# Patient Record
Sex: MALE | Race: WHITE | NOT HISPANIC OR LATINO
[De-identification: names, ages, dates, MRNs, and addresses within clinical notes are randomized per-mention and may not be internally consistent; named-entity substitution may affect disease eponyms.]

---

## 2017-02-11 PROBLEM — Z00.00 ENCOUNTER FOR PREVENTIVE HEALTH EXAMINATION: Status: ACTIVE | Noted: 2017-02-11

## 2017-02-16 ENCOUNTER — APPOINTMENT (OUTPATIENT)
Dept: UROLOGY | Facility: CLINIC | Age: 62
End: 2017-02-16

## 2017-02-16 VITALS
TEMPERATURE: 98 F | DIASTOLIC BLOOD PRESSURE: 46 MMHG | SYSTOLIC BLOOD PRESSURE: 125 MMHG | HEIGHT: 68 IN | WEIGHT: 215 LBS | HEART RATE: 62 BPM | RESPIRATION RATE: 16 BRPM | BODY MASS INDEX: 32.58 KG/M2

## 2017-02-16 DIAGNOSIS — Z86.79 PERSONAL HISTORY OF OTHER DISEASES OF THE CIRCULATORY SYSTEM: ICD-10-CM

## 2017-02-16 DIAGNOSIS — Z87.891 PERSONAL HISTORY OF NICOTINE DEPENDENCE: ICD-10-CM

## 2017-02-16 DIAGNOSIS — Z78.9 OTHER SPECIFIED HEALTH STATUS: ICD-10-CM

## 2017-02-16 RX ORDER — SILDENAFIL 20 MG/1
20 TABLET ORAL DAILY
Qty: 30 | Refills: 11 | Status: ACTIVE | COMMUNITY
Start: 2017-02-16 | End: 1900-01-01

## 2017-02-17 PROBLEM — Z86.79 HISTORY OF HYPERTENSION: Status: RESOLVED | Noted: 2017-02-16 | Resolved: 2017-02-17

## 2017-02-17 PROBLEM — Z87.891 FORMER SMOKER: Status: ACTIVE | Noted: 2017-02-16

## 2017-02-17 LAB
PSA FREE FLD-MCNC: 16.1 %
PSA FREE SERPL-MCNC: 1.04 NG/ML
PSA SERPL-MCNC: 6.46 NG/ML

## 2017-02-17 RX ORDER — AMLODIPINE AND VALSARTAN 5; 160 MG/1; MG/1
5-160 TABLET, FILM COATED ORAL
Refills: 0 | Status: ACTIVE | COMMUNITY

## 2017-03-09 ENCOUNTER — FORM ENCOUNTER (OUTPATIENT)
Age: 62
End: 2017-03-09

## 2017-03-10 ENCOUNTER — OUTPATIENT (OUTPATIENT)
Dept: OUTPATIENT SERVICES | Facility: HOSPITAL | Age: 62
LOS: 1 days | End: 2017-03-10
Payer: COMMERCIAL

## 2017-03-10 ENCOUNTER — APPOINTMENT (OUTPATIENT)
Dept: MRI IMAGING | Facility: CLINIC | Age: 62
End: 2017-03-10

## 2017-03-10 DIAGNOSIS — R97.20 ELEVATED PROSTATE SPECIFIC ANTIGEN [PSA]: ICD-10-CM

## 2017-03-10 PROCEDURE — 72197 MRI PELVIS W/O & W/DYE: CPT

## 2017-03-10 PROCEDURE — 82565 ASSAY OF CREATININE: CPT

## 2017-03-10 PROCEDURE — A9585: CPT

## 2018-07-26 PROBLEM — Z78.9 ALCOHOL USE: Status: ACTIVE | Noted: 2017-02-16

## 2019-06-25 ENCOUNTER — APPOINTMENT (OUTPATIENT)
Dept: UROLOGY | Facility: CLINIC | Age: 64
End: 2019-06-25
Payer: MEDICARE

## 2019-06-25 PROCEDURE — 99214 OFFICE O/P EST MOD 30 MIN: CPT

## 2019-06-25 RX ORDER — ASPIRIN 81 MG
81 TABLET, DELAYED RELEASE (ENTERIC COATED) ORAL
Refills: 0 | Status: ACTIVE | COMMUNITY

## 2019-06-25 RX ORDER — ROSUVASTATIN CALCIUM 20 MG/1
20 TABLET, FILM COATED ORAL
Refills: 0 | Status: ACTIVE | COMMUNITY

## 2019-06-25 NOTE — ASSESSMENT
[FreeTextEntry1] : Repeat PSA in 2 weeks, will send results.\par If remains elevated, then will agree to go forward with biopsy.

## 2019-06-25 NOTE — PHYSICAL EXAM
[General Appearance - Well Nourished] : well nourished [General Appearance - Well Developed] : well developed [Normal Appearance] : normal appearance [Well Groomed] : well groomed [General Appearance - In No Acute Distress] : no acute distress [Abdomen Soft] : soft [Abdomen Tenderness] : non-tender [Costovertebral Angle Tenderness] : no ~M costovertebral angle tenderness [Urethral Meatus] : meatus normal [Urinary Bladder Findings] : the bladder was normal on palpation [Scrotum] : the scrotum was normal [Testes Mass (___cm)] : there were no testicular masses [No Prostate Nodules] : no prostate nodules [Prostate Size ___ gm] : prostate size [unfilled] gm [] : no respiratory distress [Edema] : no peripheral edema [Exaggerated Use Of Accessory Muscles For Inspiration] : no accessory muscle use [Respiration, Rhythm And Depth] : normal respiratory rhythm and effort [Oriented To Time, Place, And Person] : oriented to person, place, and time [Affect] : the affect was normal [Not Anxious] : not anxious [Mood] : the mood was normal [Normal Station and Gait] : the gait and station were normal for the patient's age [No Focal Deficits] : no focal deficits [No Palpable Adenopathy] : no palpable adenopathy

## 2019-06-25 NOTE — HISTORY OF PRESENT ILLNESS
[FreeTextEntry1] : Here for follow up after two years.\par PSA Feb 2019: 7.1\par Jun 2019: 9.9.\par Had incident of urinary irritation in May 31st, treated with oral Bactrim.\par Prior MRI 65 mL, no suspicious lesion.  No prior biopsy.\par \par BPH: minimal symptoms.  Nocturia 2-3.  Flow is okay.\par No hematuria, dysuria.  No incontinence.\par No abdominal or flank pain.

## 2023-03-07 ENCOUNTER — APPOINTMENT (OUTPATIENT)
Dept: UROLOGY | Facility: CLINIC | Age: 68
End: 2023-03-07
Payer: MEDICARE

## 2023-03-07 VITALS
HEART RATE: 93 BPM | DIASTOLIC BLOOD PRESSURE: 72 MMHG | SYSTOLIC BLOOD PRESSURE: 122 MMHG | OXYGEN SATURATION: 96 % | TEMPERATURE: 98.5 F | HEIGHT: 68 IN | BODY MASS INDEX: 32.58 KG/M2 | WEIGHT: 215 LBS

## 2023-03-07 DIAGNOSIS — R35.1 NOCTURIA: ICD-10-CM

## 2023-03-07 PROCEDURE — 99204 OFFICE O/P NEW MOD 45 MIN: CPT

## 2023-03-08 ENCOUNTER — RESULT CHARGE (OUTPATIENT)
Age: 68
End: 2023-03-08

## 2023-03-10 LAB
APPEARANCE: CLEAR
BACTERIA: NEGATIVE
BILIRUB UR QL STRIP: NORMAL
BILIRUBIN URINE: NEGATIVE
BLOOD URINE: NEGATIVE
CLARITY UR: CLEAR
COLLECTION METHOD: NORMAL
COLOR: YELLOW
GLUCOSE QUALITATIVE U: NEGATIVE
GLUCOSE UR-MCNC: NORMAL
HCG UR QL: 0.2 EU/DL
HGB UR QL STRIP.AUTO: NORMAL
HYALINE CASTS: 0 /LPF
KETONES UR-MCNC: NORMAL
KETONES URINE: NEGATIVE
LEUKOCYTE ESTERASE UR QL STRIP: NORMAL
LEUKOCYTE ESTERASE URINE: NEGATIVE
MICROSCOPIC-UA: NORMAL
NITRITE UR QL STRIP: NORMAL
NITRITE URINE: NEGATIVE
PH UR STRIP: 5.5
PH URINE: 6
PROT UR STRIP-MCNC: NORMAL
PROTEIN URINE: NORMAL
RED BLOOD CELLS URINE: 4 /HPF
SP GR UR STRIP: 1.02
SPECIFIC GRAVITY URINE: 1.03
SQUAMOUS EPITHELIAL CELLS: 0 /HPF
UROBILINOGEN URINE: NORMAL
WHITE BLOOD CELLS URINE: 0 /HPF

## 2023-03-21 NOTE — ADDENDUM
[FreeTextEntry1] : 3/7/2023: UA micro Neg blood, Trace protein, 4 RBC, 0 WBc, 0 Epi, Neg NIT/LE\par \par 3/10/2023: Called pt and recommended CTU and cysto because of microhematuria. He is aware and wants to speak to Dr. Marquez first. He gave me permission to text her about this which I did. He will get back to me Monday\par \par 3/13/2023: Spoke to patient. He had his PSA checked he will fax it over and he agreed to CTU and cysto. Will arrange\par \par 3/20/2023: CTwwoC\par Multiple small renal cysts and BL PP cysts which cause compression on upper collecting tracts. There is no evidence of mass or stones. No hydro. Prostate is markedly enlarged impressing on base of bladder. Associated mild diffuse thickening of the wall of the bladder. No discrete mass. Sigmoid diverticulosis. Small fat cont RIH. Small low density nodule right adrenal measures less than 10Hu pre contrast and c/w adrenal adenoma. Multiple hepatic cysts.

## 2023-03-21 NOTE — HISTORY OF PRESENT ILLNESS
[FreeTextEntry1] : Language: English\par Date of First visit: 3/7/2023\par Accompanied by: Self\par Contact info: ***\par Referring Provider/PCP: Dr. Drew Martin\par phone: [phone]\par fax: [phone]\par \par Prior Urologist [phone]\par Dr. Gage Tracy\par \par Dr. Marquez\par [phone]\par \par \par \par CC/ Problem List:\par \par ===============================================================================\par FIRST VISIT:\par The patient is a 67 year male who first presents 03/07/2023 for BPH and LUTS. He is related to Dr. Marquez.\par \par He had been seeing Dr. Paz in  irregularly and had an elevated PSA. He never f/u after his visit in 2019.\par \par After Dr. Paz he went to a urologist in [facility] in 2021. He had a prostate biopsy and it was negative. His PCP then told him to never had a prostate biopsy again.\par \par He now states his PSA is around 6.9. It was done in October 2022.\par \par He is now bothered by nocturia 3-4x/night. He only voids small amts. He denies daytime frequency. He also reports slow stream. He does have hesitancy, he denies straining to void.\par \par He was given Tamsulosin 0.8mg po QHS since about 2020. He was also given Tadalafil that he takes it on occasion. \par \par -------------------------------------------------------------------------------------------\par INTERVAL VISITS:\par \par \par ===============================================================================\par \par PMH: HTN, HLD, BPH, elevated PSA\par PSH: Denies\par POBH: (if applicable)\par FH: \par \par ALL: NKDA\par MEDS: Tamsulosin, Tadalafil Rosuvastatin, Amlodipine, Losartan, Vitamin given by wife\par SOC: Quit smoking age 48yo but prior heavy smoker, +EtOH, denies drugs\par \par \par ROS: Review of Systems is as per HPI unless otherwise denoted below\par \par \par ===============================================================================\par DATA: \par \par LABS:-------------------------------------------------------------------------------------------------------------------\par 2/16/2017: PSA 6.46\par 2/18/2019: PSA 7.1\par 6/20/2019: PSA 9.9\par 3/7/2023: UA dip small blood, otherwise negative\par \par \par \par RADS:-------------------------------------------------------------------------------------------------------------------\par 3/10/2017: MRI prostate\par Prostate 5.1x5x5.2cm for volume of 69gm\par No hemorrhage. no focal finding. Moderate nodular hypertrophy; intact capsule and NVB, PIRADs 1\par \par 8/12/2022: MRI prostate \par Multinodular prostate without focal lesion to suggest malignancy PIRADS 2\par Measured 5.29x5.5x5.4 - 82gm\par \par \par PATHOLOGY/CYTOLOGY:-------------------------------------------------------------------------------------------\par \par \par \par VOIDING STUDIES: ----------------------------------------------------------------------------------------------------\par 3/7/2023: PVR 4cc\par \par \par STONE STUDIES: (Analysis/LLSA)----------------------------------------------------------------------------------\par \par \par \par PROCEDURES: -----------------------------------------------------------------------------------------------\par \par \par \par \par ===============================================================================\par \par PHYSICAL EXAM:\par \par GEN: AAOx3, NAD; Habitus: BMI 32\par \par BARRIERS to CARE: none\par \par PSYCH: Appropriate Behavior, Affect Congruent\par \par HEENT: AT/NC Trachea midline. EOMI.\par \par Lungs: No labored breathing.\par \par NEURO: + Movement, all 4 extremities grossly intact without deficits. No tremors.\par \par SKIN: Warm dry. No visible rashes or ulcers\par \par GAIT: Gait mildly antalgic, Stability good\par \par \par  FOCUSED: -------------------------------------------------------------------------------------------------\par \par Prostate: (date 3/7/2023) >50 grams. Smooth. No nodules. Symmetric. No tenderness, No Fluctuance.\par LEI: no hemorrhoids. Normal tone.\par =======================================================================================\par \par \par \par \par ASSESSMENT and PLAN\par \par The patient is a 67 year male with a history of the following:\par \par 1. LUTS with prostate volume >80gm, PIRADS 2, stable PSA by patient report with normal PSAD, low PSA, no sign of UTI on Tamsulosin 0.8mg po QHS and PRN Tadalafil with nocturia x3, weak stream and hesitancy\par \par I told him that he has the following options in this regard\par a. There is nothing dangerous going on. We can observe him.\par b. We can add finasteride 5mg. \par  We discussed the mechanism of action of finasteride and the reasons why we prescribe it. We generally use this medication for men with large (volume wise >40gm) prostates with BPH symptoms. This medication has the added benefit of reducing prostatic bleeding (spontaneous or surgical). Over the next six months (for finasteride) or 3-4 months (for dutasteride) it will also reduce volume by an average of 50%. It will also reduce PSA by an average of 50%. Its impact on cancer is somewhat unclear, but the reduction of PSA necessitates doubling the patient's PSA in order to the know the "true" PSA.\par The patient understands that the side effects of this medication include changes in ejaculation, ED and breast swelling. If any side effects occur that bother the patient, he can stop taking it. \par c. He could consider a surgery/procedure with the most definitive being HOLEP\par \par He prefers to have LUTS than have surgery and is not even sure if he wants to add finasteride. We will regroup for a video visit in 4 weeks to see what he wants to do.\par \par 2. h/o elevated PSA\par We are requesting his records from Dr. Ryndin (he will go in person to get them) and we are checking his PSA today. If it is still around 7, his PSA is stable and his PSAD is normal and I would NOT offer him a biopsy.\par \par 3. ED\par I told him if he takes tadalafil, he should not take tamsulosin that day as they interact and can cause hypotension and fainting and other sequelae including heart attack\par \par 4. r/o microhematuria\par Will send UA micro\par \par \par \par -----------------------------------------------------------------------------------------------------\par LABS/TESTS Ordered: F:T PSA, UA micro\par Meds Ordered: cont Tamsulosin, if he takes tadalafil do not take with or on same day as tamsulosin\par Follow up: VV 4 weeks\par -----------------------------------------------------------------------------------------------------\par \par The total amount of time I have personally spent preparing for this visit, reviewing the patient's test results, obtaining external history, ordering tests/medications, documenting clinical information, communicating with and counseling the patient/family and/or caregiver(s), and spent face to face with the patient explaining the above was  50 minutes.\par \par Thank you for allowing me to assist in the care of your patient. Should you have any questions please do not hesitate to reach out to me.\par \par \par Amber Mayer MD\par Associate \par Department of Urology\par [facility]\par Phone: [phone]\par Fax: [phone]\par \par [address] [place] [address]\par [facility] [address]\par

## 2023-03-28 ENCOUNTER — APPOINTMENT (OUTPATIENT)
Dept: UROLOGY | Facility: CLINIC | Age: 68
End: 2023-03-28
Payer: MEDICARE

## 2023-03-28 VITALS
DIASTOLIC BLOOD PRESSURE: 71 MMHG | HEART RATE: 63 BPM | SYSTOLIC BLOOD PRESSURE: 138 MMHG | TEMPERATURE: 97.8 F | OXYGEN SATURATION: 96 %

## 2023-03-28 PROCEDURE — 52000 CYSTOURETHROSCOPY: CPT

## 2023-03-28 PROCEDURE — 99214 OFFICE O/P EST MOD 30 MIN: CPT | Mod: 25

## 2023-03-29 ENCOUNTER — RESULT CHARGE (OUTPATIENT)
Age: 68
End: 2023-03-29

## 2023-03-29 LAB
BILIRUB UR QL STRIP: NORMAL
CLARITY UR: CLEAR
COLLECTION METHOD: NORMAL
GLUCOSE UR-MCNC: NORMAL
HCG UR QL: 0.2 EU/DL
HGB UR QL STRIP.AUTO: NORMAL
KETONES UR-MCNC: NORMAL
LEUKOCYTE ESTERASE UR QL STRIP: NORMAL
NITRITE UR QL STRIP: NORMAL
PH UR STRIP: 5.5
PROT UR STRIP-MCNC: NORMAL
SP GR UR STRIP: 1.02

## 2023-09-26 ENCOUNTER — APPOINTMENT (OUTPATIENT)
Dept: UROLOGY | Facility: CLINIC | Age: 68
End: 2023-09-26

## 2023-10-17 ENCOUNTER — APPOINTMENT (OUTPATIENT)
Dept: UROLOGY | Facility: CLINIC | Age: 68
End: 2023-10-17
Payer: MEDICARE

## 2023-10-17 VITALS
DIASTOLIC BLOOD PRESSURE: 80 MMHG | TEMPERATURE: 98 F | SYSTOLIC BLOOD PRESSURE: 133 MMHG | HEART RATE: 65 BPM | OXYGEN SATURATION: 98 %

## 2023-10-17 PROCEDURE — 99214 OFFICE O/P EST MOD 30 MIN: CPT

## 2024-01-11 ENCOUNTER — RX RENEWAL (OUTPATIENT)
Age: 69
End: 2024-01-11

## 2024-02-08 ENCOUNTER — RX RENEWAL (OUTPATIENT)
Age: 69
End: 2024-02-08

## 2024-02-08 RX ORDER — FINASTERIDE 5 MG/1
5 TABLET, FILM COATED ORAL DAILY
Qty: 30 | Refills: 1 | Status: ACTIVE | COMMUNITY
Start: 2023-10-17 | End: 1900-01-01

## 2024-03-11 ENCOUNTER — APPOINTMENT (OUTPATIENT)
Dept: UROLOGY | Facility: CLINIC | Age: 69
End: 2024-03-11
Payer: MEDICARE

## 2024-03-11 VITALS
DIASTOLIC BLOOD PRESSURE: 76 MMHG | TEMPERATURE: 98.24 F | HEART RATE: 89 BPM | OXYGEN SATURATION: 98 % | SYSTOLIC BLOOD PRESSURE: 115 MMHG

## 2024-03-11 DIAGNOSIS — N52.01 ERECTILE DYSFUNCTION DUE TO ARTERIAL INSUFFICIENCY: ICD-10-CM

## 2024-03-11 DIAGNOSIS — N13.8 BENIGN PROSTATIC HYPERPLASIA WITH LOWER URINARY TRACT SYMPMS: ICD-10-CM

## 2024-03-11 DIAGNOSIS — R97.20 ELEVATED PROSTATE, SPECIFIC ANTIGEN [PSA]: ICD-10-CM

## 2024-03-11 DIAGNOSIS — N40.1 BENIGN PROSTATIC HYPERPLASIA WITH LOWER URINARY TRACT SYMPMS: ICD-10-CM

## 2024-03-11 DIAGNOSIS — R31.29 OTHER MICROSCOPIC HEMATURIA: ICD-10-CM

## 2024-03-11 PROCEDURE — 99214 OFFICE O/P EST MOD 30 MIN: CPT

## 2024-03-12 LAB
APPEARANCE: CLEAR
BACTERIA: NEGATIVE /HPF
BILIRUBIN URINE: NEGATIVE
BLOOD URINE: NEGATIVE
CAST: 0 /LPF
COLOR: YELLOW
EPITHELIAL CELLS: 0 /HPF
GLUCOSE QUALITATIVE U: NEGATIVE MG/DL
KETONES URINE: NEGATIVE MG/DL
LEUKOCYTE ESTERASE URINE: NEGATIVE
MICROSCOPIC-UA: NORMAL
NITRITE URINE: NEGATIVE
PH URINE: 5.5
PROTEIN URINE: NEGATIVE MG/DL
RED BLOOD CELLS URINE: 1 /HPF
SPECIFIC GRAVITY URINE: 1.02
UROBILINOGEN URINE: 0.2 MG/DL
WHITE BLOOD CELLS URINE: 0 /HPF

## 2024-03-13 LAB — BACTERIA UR CULT: NORMAL

## 2024-03-13 NOTE — ADDENDUM
[FreeTextEntry1] : 3/12/24: UA with 1 RBC/hpf. Patient aware. 3/13/24: UCx <10,000 normal urogenital fernanda     I, Dr. Mayer, personally performed the evaluation and management (E/M) services for this established patient who presents today with (a) new problem(s)/exacerbation of (an) existing condition(s).  That E/M includes conducting the examination, assessing all new/exacerbated conditions, and establishing a new plan of care.  Today, my ACP, Sonya Wetzel, was here to observe my evaluation and management services for this new problem/exacerbated condition to be followed going forward.

## 2024-03-13 NOTE — HISTORY OF PRESENT ILLNESS
[FreeTextEntry1] : Language: English Date of First visit: 3/7/2023 Accompanied by: Self Contact info: *** Referring Provider/PCP: Dr. Drew Martin phone: [phone] fax: [phone]  Prior Urologist [phone] Dr. Gage Marquez [phone]    CC/ Problem List:  =============================================================================== FIRST VISIT: The patient is a 67 year male who first presents 2023 for BPH and LUTS. He is related to Dr. Marquez.  He had been seeing Dr. Paz in  irregularly and had an elevated PSA. He never f/u after his visit in .  After Dr. Paz he went to a urologist in [facility] in . He had a prostate biopsy and it was negative. His PCP then told him to never had a prostate biopsy again.  He now states his PSA is around 6.9. It was done in 2022.  He is now bothered by nocturia 3-4x/night. He only voids small amts. He denies daytime frequency. He also reports slow stream. He does have hesitancy, he denies straining to void.  He was given Tamsulosin 0.8mg po QHS since about . He was also given Tadalafil that he takes it on occasion.   ------------------------------------------------------------------------------------------- INTERVAL VISITS:  He had microhematuria and so we offered him a CTU to cysto. In 2023 his CTU showed a small adrenal adneoma and his cysto was normal except for BPH with an occlusive friable 4cm prostate. He is a little unhappy with his LUTS but does not want to try finasteride. He brings in his records fro Dr. Tracy today. He had a negative prostate biopsy in Dec 2020 and a negative prostate MRI in Aug 2022 (volume 89gm). He has nocturia is 4-5x/night. He is still on tamsulosin 0.8mg po QHS. He does NOT want surgery. He is adamant about not having surgery or a procedure.  The patient's age today 2024 is 68 year old. Please note interval events and changes in PMH, PSH, MEDS and ALLERGIES were reviewed. He feels well today and is satisfied for the most part on dual therapy as he is not interested in surgery. Symptoms remain stable.  He has not had any gross hematuria or infections since last OV.  Interested in starting PDE5i (he has been prescribed but never taken) Strong libido, difficulty with firmness however able to maintain erection PSA stable, PSAD normal    ===============================================================================  PMH: HTN, HLD, BPH, elevated PSA PSH: Denies POBH: (if applicable) FH:   ALL: NKDA MEDS: Tamsulosin 0.8mg po QHS, Tadalafil Rosuvastatin, Amlodipine, Losartan, Vitamin given by wife SOC: Quit smoking age 50yo but prior heavy smoker, +EtOH, denies drugs   ROS: Review of Systems is as per HPI unless otherwise denoted below   =============================================================================== DATA:   LABS:------------------------------------------------------------------------------------------------------------------- 2017: PSA 6.46 2019: PSA 7.1 2019: PSA 9.9 2020: sCre 0.96, UA negative, PSA 8.3, Free 14%, T 446 2020: Rectal culture Citrobacter R: Amp, Ancef 2022: PSA 10.05, T 298 3/7/2023: UA dip small blood, otherwise negative 3/7/2023: UA micro Neg blood, Trace protein, 4 RBC, 0 WBc, 0 Epi, Neg NIT/LE 3/11/2023: scre 1.01, PSA 8.91, UA 10-20 RBC 2023: PSA 8.24 3/8/2024: PSA  3.83 on 5ARI (corrected to 7.66 ng/ml)  RADS:------------------------------------------------------------------------------------------------------------------- 3/10/2017: MRI prostate Prostate 5.1x5x5.2cm for volume of 69gm No hemorrhage. no focal finding. Moderate nodular hypertrophy; intact capsule and NVB, PIRADs 1  2020: Volume 94.8gm  2022: MRI prostate  Multinodular prostate without focal lesion to suggest malignancy PIRADS 2 Measured 5.29x5.5x5.4 - 82gm  3/20/2023: CTwwoC Multiple small renal cysts and BL PP cysts which cause compression on upper collecting tracts. There is no evidence of mass or stones. No hydro. Prostate is markedly enlarged impressing on base of bladder. Associated mild diffuse thickening of the wall of the bladder. No discrete mass. Sigmoid diverticulosis. Small fat cont RIH. Small low density nodule right adrenal measures less than 10Hu pre contrast and c/w adrenal adenoma. Multiple hepatic cysts.   PATHOLOGY/CYTOLOGY:------------------------------------------------------------------------------------------- 2020: 12 core Prostate biopsy from Dr. Rossana Gamez benign   VOIDING STUDIES: ---------------------------------------------------------------------------------------------------- 3/7/2023: PVR 4cc 10/17/2023: PVR 21cc   STONE STUDIES: (Analysis/LLSA)----------------------------------------------------------------------------------    PROCEDURES: ----------------------------------------------------------------------------------------------- 3/28/2023: Cysto Normal urethra; 4cm prostate occlusive, friable; + median lobe normal bladder    ===============================================================================  PHYSICAL EXAM:  GEN: AAOx3, NAD; Habitus: BMI 32  BARRIERS to CARE: none  PSYCH: Appropriate Behavior, Affect Congruent  HEENT: AT/NC Trachea midline. EOMI.  Lungs: No labored breathing.  NEURO: + Movement, all 4 extremities grossly intact without deficits. No tremors.  SKIN: Warm dry. No visible rashes or ulcers  GAIT: Gait mildly antalgic, Stability good   FOCUSED: -------------------------------------------------------------------------------------------------  Prostate: (date 3/7/2023) >50 grams. Smooth. No nodules. Symmetric. No tenderness, No Fluctuance. LEI: no hemorrhoids. Normal tone. =======================================================================================  ASSESSMENT and PLAN  Today 2024 the patient is a 68 year old male with a history of the followin. LUTS with prostate volume >80gm, PIRADS 2, stable PSA by patient report with normal PSAD, low PSA, no sign of UTI on Tamsulosin 0.8mg po QHS and Finasteride with nocturia x3, weak stream and hesitancy Continue dual therapy He is adamant that he does not want a surgery/procedure. Clarified he does not take PRN tadalafil (see #4 below)  2. h/o elevated PSA His records were reviewed. His most recent PSA in 2024 was 7.66 (corrected for 5ARI), which, while high, has been relatively stable since 2019. He had a biopsy in  (non fusion) that was negative and a prostate MRI in  that was negative. He will get his PSA checked in 6 mos with his PCP and we will do an in person visit in 2024 before he goes to [place] We did discuss performing a select MDx to stratify risk which he declined.   3. r/o microhematuria Repeat UA micro today 3/11/24 His CT and cystoscopy in 2023 were negative except for a large friable prostate with median lobe. His CTU showed an adrenal adenoma (small)  Per the new  AUA microhematuria guidelines, patients that undergo a negative evaluation (with either Cystoscopy and Ultrasound or CT) should have a repeat urinalysis in 12 months. If that repeat is negative they can f/u PRN however if their urine is positive, there should be shared decision making regarding repeat evaluation vs observation. Any patient with prior negative workup who develops gross hematuria, significant increase in degree of microhematuria or new  symptoms should undergo further evaluation.  We will send a UA micro today  4. Erectile dysfunction He never tried the PRN sildenafil prescribed by Dr Paz and is interested. After discussion of side effects, he is no longer interested. He will let us know in the future if he would like to start.  ----------------------------------------------------------------------------------------------------- LABS/TESTS Ordered: PSA in 2024 with PCP; repeat UA micro with UCx Meds Ordered: cont Tamsulosin and Finasteride Follow up: OV 2024 -----------------------------------------------------------------------------------------------------  The total amount of time I have personally spent preparing for this visit, reviewing the patient's test results, obtaining external history, ordering tests/medications, documenting clinical information, communicating with and counseling the patient/family and/or caregiver(s), and spent face to face with the patient explaining the above was 35 minutes.  Thank you for allowing me to assist in the care of your patient. Should you have any questions please do not hesitate to reach out to me.   Amber Mayer MD Associate  Department of Urology [facility] Phone: [phone] Fax: [phone] [address] [address]

## 2024-04-08 ENCOUNTER — RX RENEWAL (OUTPATIENT)
Age: 69
End: 2024-04-08

## 2024-04-08 RX ORDER — TAMSULOSIN HYDROCHLORIDE 0.4 MG/1
0.4 CAPSULE ORAL
Qty: 60 | Refills: 6 | Status: ACTIVE | COMMUNITY
Start: 2023-03-28 | End: 1900-01-01

## 2024-11-05 ENCOUNTER — APPOINTMENT (OUTPATIENT)
Dept: UROLOGY | Facility: CLINIC | Age: 69
End: 2024-11-05
Payer: MEDICARE

## 2024-11-05 VITALS
SYSTOLIC BLOOD PRESSURE: 128 MMHG | OXYGEN SATURATION: 98 % | TEMPERATURE: 208.4 F | DIASTOLIC BLOOD PRESSURE: 80 MMHG | HEART RATE: 83 BPM

## 2024-11-05 PROCEDURE — 99214 OFFICE O/P EST MOD 30 MIN: CPT

## 2024-11-18 ENCOUNTER — APPOINTMENT (OUTPATIENT)
Dept: UROLOGY | Facility: CLINIC | Age: 69
End: 2024-11-18

## 2024-12-25 PROBLEM — F10.90 ALCOHOL USE: Status: ACTIVE | Noted: 2017-02-16

## 2025-04-16 ENCOUNTER — APPOINTMENT (OUTPATIENT)
Dept: UROLOGY | Facility: CLINIC | Age: 70
End: 2025-04-16
Payer: MEDICARE

## 2025-04-16 ENCOUNTER — NON-APPOINTMENT (OUTPATIENT)
Age: 70
End: 2025-04-16

## 2025-04-16 DIAGNOSIS — N40.1 BENIGN PROSTATIC HYPERPLASIA WITH LOWER URINARY TRACT SYMPMS: ICD-10-CM

## 2025-04-16 DIAGNOSIS — N13.8 BENIGN PROSTATIC HYPERPLASIA WITH LOWER URINARY TRACT SYMPMS: ICD-10-CM

## 2025-04-16 DIAGNOSIS — R97.20 ELEVATED PROSTATE, SPECIFIC ANTIGEN [PSA]: ICD-10-CM

## 2025-04-16 PROCEDURE — 99214 OFFICE O/P EST MOD 30 MIN: CPT

## 2025-09-09 ENCOUNTER — RX RENEWAL (OUTPATIENT)
Age: 70
End: 2025-09-09